# Patient Record
Sex: MALE | Race: WHITE | Employment: FULL TIME | URBAN - METROPOLITAN AREA
[De-identification: names, ages, dates, MRNs, and addresses within clinical notes are randomized per-mention and may not be internally consistent; named-entity substitution may affect disease eponyms.]

---

## 2020-07-22 ENCOUNTER — APPOINTMENT (OUTPATIENT)
Dept: GENERAL RADIOLOGY | Age: 30
End: 2020-07-22

## 2020-07-22 ENCOUNTER — HOSPITAL ENCOUNTER (EMERGENCY)
Age: 30
Discharge: HOME | End: 2020-07-22

## 2020-07-22 VITALS
OXYGEN SATURATION: 100 % | HEART RATE: 80 BPM | SYSTOLIC BLOOD PRESSURE: 107 MMHG | DIASTOLIC BLOOD PRESSURE: 70 MMHG | RESPIRATION RATE: 18 BRPM | TEMPERATURE: 98.4 F

## 2020-07-22 PROCEDURE — 12001 RPR S/N/AX/GEN/TRNK 2.5CM/<: CPT

## 2020-07-22 PROCEDURE — 73130 X-RAY EXAM OF HAND: CPT

## 2020-07-22 PROCEDURE — 6370000000 HC RX 637 (ALT 250 FOR IP): Performed by: PHYSICIAN ASSISTANT

## 2020-07-22 PROCEDURE — 2500000003 HC RX 250 WO HCPCS: Performed by: PHYSICIAN ASSISTANT

## 2020-07-22 PROCEDURE — 99283 EMERGENCY DEPT VISIT LOW MDM: CPT

## 2020-07-22 RX ORDER — LIDOCAINE HYDROCHLORIDE AND EPINEPHRINE 10; 10 MG/ML; UG/ML
20 INJECTION, SOLUTION INFILTRATION; PERINEURAL ONCE
Status: COMPLETED | OUTPATIENT
Start: 2020-07-22 | End: 2020-07-22

## 2020-07-22 RX ORDER — CEPHALEXIN 500 MG/1
500 CAPSULE ORAL 3 TIMES DAILY
Qty: 21 CAPSULE | Refills: 0 | Status: SHIPPED | OUTPATIENT
Start: 2020-07-22 | End: 2020-07-29

## 2020-07-22 RX ORDER — CEPHALEXIN 500 MG/1
1000 CAPSULE ORAL ONCE
Status: COMPLETED | OUTPATIENT
Start: 2020-07-22 | End: 2020-07-22

## 2020-07-22 RX ADMIN — LIDOCAINE HYDROCHLORIDE,EPINEPHRINE BITARTRATE 20 ML: 10; .01 INJECTION, SOLUTION INFILTRATION; PERINEURAL at 20:21

## 2020-07-22 RX ADMIN — CEPHALEXIN 1000 MG: 500 CAPSULE ORAL at 20:27

## 2020-07-22 ASSESSMENT — PAIN DESCRIPTION - ORIENTATION
ORIENTATION: RIGHT
ORIENTATION: RIGHT

## 2020-07-22 ASSESSMENT — PAIN DESCRIPTION - DESCRIPTORS
DESCRIPTORS: BURNING;CONSTANT
DESCRIPTORS: ACHING

## 2020-07-22 ASSESSMENT — PAIN DESCRIPTION - LOCATION
LOCATION: HAND
LOCATION: HAND

## 2020-07-22 ASSESSMENT — PAIN DESCRIPTION - PAIN TYPE
TYPE: ACUTE PAIN
TYPE: ACUTE PAIN

## 2020-07-22 ASSESSMENT — PAIN SCALES - GENERAL
PAINLEVEL_OUTOF10: 7
PAINLEVEL_OUTOF10: 3

## 2020-07-22 ASSESSMENT — PAIN - FUNCTIONAL ASSESSMENT: PAIN_FUNCTIONAL_ASSESSMENT: PREVENTS OR INTERFERES SOME ACTIVE ACTIVITIES AND ADLS

## 2020-07-22 ASSESSMENT — PAIN DESCRIPTION - PROGRESSION: CLINICAL_PROGRESSION: GRADUALLY WORSENING

## 2020-07-22 ASSESSMENT — PAIN SCALES - WONG BAKER: WONGBAKER_NUMERICALRESPONSE: 8

## 2020-07-22 ASSESSMENT — PAIN DESCRIPTION - FREQUENCY: FREQUENCY: CONTINUOUS

## 2020-07-22 NOTE — ED PROVIDER NOTES
[address] [facility]        Pt Name: Miklaa Zhou  MRN: [id number]  [place] 1990  Date of evaluation: 7/22/2020  Provider: Sagar Layne PA-C  PCP: No primary care provider on file. Evaluation by JT. My supervising physician was available for consultation. [facility], [address] [place] [address]       Chief Complaint   Patient presents with    Laceration     right hand       HISTORY OF PRESENT ILLNESS   (Location, Timing/Onset, Context/Setting, Quality, Duration, Modifying Factors, Severity, Associated Signs and Symptoms)  Note limiting factors. Mikala Zhou is a 35 y.o. male patient resenting with his father with complaint laceration dominant right hand dorsal sciatic and MCP joint. No distal paresthesia. He has good movement. States about 5:30 PM this evening doing dishes, washing a glass when the top broke causing this laceration injury. He does state all pieces accounted for. Tetanus shot less than 5 years. Nursing Notes were all reviewed and agreed with or any disagreements were addressed in the HPI. REVIEW OF SYSTEMS    (2-9 systems for level 4, 10 or more for level 5)     Review of Systems    Positives and Pertinent negatives as per HPI. Except as noted above in the ROS, all other systems were reviewed and negative. PAST MEDICAL HISTORY     Past Medical History:   Diagnosis Date    ADHD (attention deficit hyperactivity disorder)          SURGICAL HISTORY   No past surgical history on file. [address]       Discharge Medication List as of 7/22/2020  9:11 PM            ALLERGIES     Patient has no known allergies. FAMILYHISTORY     No family history on file.        SOCIAL HISTORY       Social History     Tobacco Use    Smoking status: Current Every Day Smoker     Packs/day: 1.00   Substance Use Topics    Alcohol use: Yes     Comment: SOCIALLY    Drug use: Yes     Types: Marijuana       SCREENINGS    Mauricio Coma Scale  Eye Opening: Spontaneous  Best Verbal Response: Oriented  Best Motor Response: Obeys commands  [place] Coma Scale Score: 15        PHYSICAL EXAM    (up to 7 for level 4, 8 or more for level 5)     ED Triage Vitals [07/22/20 1924]   BP Temp Temp Source Pulse Resp SpO2 Height Weight   107/70 98.4 °F (36.9 °C) Oral 80 18 100 % -- --       Physical Exam  Vitals signs and nursing note reviewed. Constitutional:       Appearance: Normal appearance. He is well-developed and normal weight. HENT:      Head: Normocephalic and atraumatic. Right Ear: External ear normal.      Left Ear: External ear normal.   Eyes:      General: No scleral icterus. Right eye: No discharge. Left eye: No discharge. Conjunctiva/sclera: Conjunctivae normal.   Neck:      Musculoskeletal: Normal range of motion and neck supple. Cardiovascular:      Rate and Rhythm: Normal rate and regular rhythm. Heart sounds: Normal heart sounds. Pulmonary:      Effort: Pulmonary effort is normal.      Breath sounds: Normal breath sounds. Musculoskeletal: Normal range of motion. Comments: Patient has a curvilinear laceration dorsal aspect of the dominant right second MCP joint. He has good strength against resistance. Low suspicion for extensor tendon laceration. Patient does have sensory integrity to the tip. Good brisk nailbed refill noted less than 2 seconds. Skin:     General: Skin is warm and dry. Neurological:      General: No focal deficit present. Mental Status: He is alert and oriented to person, place, and time. Mental status is at baseline. Psychiatric:         Mood and Affect: Mood normal.         Behavior: Behavior normal.         Thought Content:  Thought content normal.         Judgment: Judgment normal.               DIAGNOSTIC RESULTS   LABS:    Labs Reviewed - No data to display    All other labs were within normal range or not returned as of this dictation. EKG: All EKG's are interpreted by the Emergency Department Physician in the absence of a cardiologist.  Please see their note for interpretation of EKG. RADIOLOGY:   Non-plain film images such as CT, Ultrasound and MRI are read by the radiologist. Plain radiographic images are visualized and preliminarily interpreted by the ED Provider with the below findings:    X-ray shows no evidence foreign body. Interpretation per the Radiologist below, if available at the time of this note:    XR HAND RIGHT (MIN 3 VIEWS)   Final Result   No acute osseous abnormality. No retained radiopaque foreign body. No results found. PROCEDURES     I did use 1% labium with epinephrine to perform a local anesthesia. Once anesthesia was noted was draped in sterile fashion cleansed with chlorhexidine the rinse and irrigated with copious amounts of saline. Exploration reveals evidence of tendon exposure without transection of tendon. Excellent strength against resistance. Total knee length is 2 cm. The wound was primarily closed. I did not see any for material in the wound. X-ray obtained showed no evidence of foreign body. Patient given Keflex 1000 mg p.o. Epic and a dry sterile dressing secured over the site. Procedures    CRITICAL CARE TIME   N/A    CONSULTS:  None      EMERGENCY DEPARTMENT COURSE and DIFFERENTIAL DIAGNOSIS/MDM:   Vitals:    Vitals:    07/22/20 1924   BP: 107/70   Pulse: 80   Resp: 18   Temp: 98.4 °F (36.9 °C)   TempSrc: Oral   SpO2: 100%       Patient was given the following medications:  Medications   lidocaine-EPINEPHrine 1 percent-1:[id number] injection 20 mL (20 mLs Intradermal Given by Other 7/22/20 2021)   cephALEXin (KEFLEX) capsule 1,000 mg (1,000 mg Oral Given 7/22/20 2027)           Patient resenting with a 2 cm laceration curvilinear over the dorsal aspect on the right hand second MCP joint. Primary closure tonight. Tendon exposure without transection. Excellent strength against resistance. No foreign body seen on visual exam.  No foreign body seen on x-ray. I do recommend patient have suture removal in approximately 10 days by healthcare provider. He was ibuprofen 600 mg and/or Tylenol 1000 mg for pain control. Elevate and apply ice as needed. The patient father both expressed understanding of the diagnosis and the treatment plan. FINAL IMPRESSION      1. Laceration of right hand without foreign body, initial encounter          DISPOSITION/PLAN   DISPOSITION        PATIENT REFERREDTO:  Your healthcare provider    In 10 days  For suture removal    [address] [facility] Emergency Department  [address] [address] [phone]  Go in 10 days  For suture removal      DISCHARGE MEDICATIONS:  Discharge Medication List as of 7/22/2020  9:11 PM      START taking these medications    Details   cephALEXin (KEFLEX) 500 MG capsule Take 1 capsule by mouth 3 times daily for 7 days, Disp-21 capsule,R-0Print             DISCONTINUED MEDICATIONS:  Discharge Medication List as of 7/22/2020  9:11 PM                 (Please note that portions of this note were completed with a voice recognition program.  Efforts were made to edit the dictations but occasionally words are mis-transcribed. )    Jacob Zhang PA-C (electronically signed)           Jacob Zhang PA-C  07/22/20 [id number]

## 2021-08-10 ENCOUNTER — HOSPITAL ENCOUNTER (EMERGENCY)
Age: 31
Discharge: HOME | End: 2021-08-10
Payer: MEDICAID

## 2021-08-10 VITALS
HEART RATE: 88 BPM | SYSTOLIC BLOOD PRESSURE: 135 MMHG | RESPIRATION RATE: 16 BRPM | TEMPERATURE: 98.7 F | OXYGEN SATURATION: 100 % | DIASTOLIC BLOOD PRESSURE: 86 MMHG

## 2021-08-10 DIAGNOSIS — L02.419 CELLULITIS AND ABSCESS OF UPPER EXTREMITY: Primary | ICD-10-CM

## 2021-08-10 DIAGNOSIS — L03.119 CELLULITIS AND ABSCESS OF UPPER EXTREMITY: Primary | ICD-10-CM

## 2021-08-10 PROCEDURE — 2500000003 HC RX 250 WO HCPCS: Performed by: PHYSICIAN ASSISTANT

## 2021-08-10 PROCEDURE — 6370000000 HC RX 637 (ALT 250 FOR IP): Performed by: PHYSICIAN ASSISTANT

## 2021-08-10 PROCEDURE — 99284 EMERGENCY DEPT VISIT MOD MDM: CPT

## 2021-08-10 PROCEDURE — 10061 I&D ABSCESS COMP/MULTIPLE: CPT

## 2021-08-10 RX ORDER — GREEN TEA/HOODIA GORDONII 315-12.5MG
1 CAPSULE ORAL 2 TIMES DAILY
Qty: 60 TABLET | Refills: 0 | Status: SHIPPED | OUTPATIENT
Start: 2021-08-10 | End: 2021-09-09

## 2021-08-10 RX ORDER — IBUPROFEN 600 MG/1
600 TABLET ORAL EVERY 6 HOURS PRN
Qty: 30 TABLET | Refills: 0 | Status: SHIPPED | OUTPATIENT
Start: 2021-08-10

## 2021-08-10 RX ORDER — HYDROCODONE BITARTRATE AND ACETAMINOPHEN 5; 325 MG/1; MG/1
1 TABLET ORAL ONCE
Status: COMPLETED | OUTPATIENT
Start: 2021-08-10 | End: 2021-08-10

## 2021-08-10 RX ORDER — LIDOCAINE HYDROCHLORIDE AND EPINEPHRINE BITARTRATE 20; .01 MG/ML; MG/ML
20 INJECTION, SOLUTION SUBCUTANEOUS ONCE
Status: COMPLETED | OUTPATIENT
Start: 2021-08-10 | End: 2021-08-10

## 2021-08-10 RX ORDER — CLINDAMYCIN HYDROCHLORIDE 300 MG/1
300 CAPSULE ORAL 3 TIMES DAILY
Qty: 21 CAPSULE | Refills: 0 | Status: SHIPPED | OUTPATIENT
Start: 2021-08-10 | End: 2021-08-17

## 2021-08-10 RX ADMIN — HYDROCODONE BITARTRATE AND ACETAMINOPHEN 1 TABLET: 5; 325 TABLET ORAL at 21:49

## 2021-08-10 RX ADMIN — LIDOCAINE HYDROCHLORIDE AND EPINEPHRINE 20 ML: 20; 10 INJECTION, SOLUTION INFILTRATION; PERINEURAL at 22:50

## 2021-08-10 ASSESSMENT — PAIN DESCRIPTION - DESCRIPTORS: DESCRIPTORS: ACHING

## 2021-08-10 ASSESSMENT — PAIN SCALES - GENERAL
PAINLEVEL_OUTOF10: 7

## 2021-08-10 ASSESSMENT — PAIN DESCRIPTION - ORIENTATION: ORIENTATION: RIGHT;LOWER

## 2021-08-10 ASSESSMENT — PAIN DESCRIPTION - FREQUENCY: FREQUENCY: CONTINUOUS

## 2021-08-10 ASSESSMENT — PAIN DESCRIPTION - PROGRESSION: CLINICAL_PROGRESSION: NOT CHANGED

## 2021-08-10 ASSESSMENT — PAIN DESCRIPTION - ONSET: ONSET: ON-GOING

## 2021-08-10 ASSESSMENT — PAIN DESCRIPTION - LOCATION: LOCATION: ARM

## 2021-08-10 ASSESSMENT — PAIN - FUNCTIONAL ASSESSMENT
PAIN_FUNCTIONAL_ASSESSMENT: 0-10
PAIN_FUNCTIONAL_ASSESSMENT: PREVENTS OR INTERFERES SOME ACTIVE ACTIVITIES AND ADLS

## 2021-08-10 ASSESSMENT — PAIN DESCRIPTION - PAIN TYPE: TYPE: ACUTE PAIN

## 2021-08-11 NOTE — ED PROVIDER NOTES
[address]  [address] [address]  Dept: [phone]  Loc: [address] ENCOUNTER        This patient was not seen or evaluated by the attending physician. I evaluated this patient, the attending physician was available for consultation. CHIEF COMPLAINT    Chief Complaint   Patient presents with    Insect Bite     Pt reports getting \"bit by spider over a week ago\" and presents with wounds two wounds to right arm and 1 to left leg. Pt states he was seen at another hospital and discharged home with antibiotics which \"haven't helped\".  Wound Check       HPI    Amparo Hernandez is a 28 y.o. male who presents with an area of pain, redness, and swelling localized to his right arm. Onset was about a week ago. The duration has been constant since the onset. The quality is sharp. There are no alleviating factors. The patient had previously been seen at an outside urgency room, and was started on Keflex, Bactrim. He had an incision and drainage to his left leg, but the right arm did not require incision and drainage at that time. He states in the last few days that the arm has become more red and swollen in 2 areas, so he is back to be evaluated. His leg, however, has improved markedly. Just prior to arrival tonight, he was at [facility] emergency room, and had been waiting for some time, got frustrated and left before he had any interventions done. Patient denies any fever, chills, nausea or vomiting. He is not diabetic. He denies any previous history of drug use. He has no further complaints at this time. REVIEW OF SYSTEMS    Skin: Painful skin lesion as mentioned above  General: No fevers or chills    PAST MEDICAL & SURGICAL HISTORY    Past Medical History:   Diagnosis Date    ADHD (attention deficit hyperactivity disorder)      No past surgical history on file.     CURRENT MEDICATIONS  (may include discharge medications prescribed in the ED)  Current Outpatient Rx   Medication Sig Dispense Refill    clindamycin (CLEOCIN) 300 MG capsule Take 1 capsule by mouth 3 times daily for 7 days 21 capsule 0    Probiotic Acidophilus (FLORANEX) TABS Take 1 tablet by mouth 2 times daily 60 tablet 0    ibuprofen (ADVIL;MOTRIN) 600 MG tablet Take 1 tablet by mouth every 6 hours as needed for Pain 30 tablet 0       ALLERGIES    No Known Allergies    SOCIAL & FAMILY HISTORY    Social History     Socioeconomic History    Marital status: Single     Spouse name: Not on file    Number of children: Not on file    Years of education: Not on file    Highest education level: Not on file   Occupational History    Not on file   Tobacco Use    Smoking status: Current Every Day Smoker     Packs/day: 1.00   Substance and Sexual Activity    Alcohol use: Yes     Comment: SOCIALLY    Drug use: Yes     Types: Marijuana    Sexual activity: Not on file   Other Topics Concern    Not on file   Social History Narrative    Not on file     Social Determinants of Health     Financial Resource Strain:     Difficulty of Paying Living Expenses:    Food Insecurity:     Worried About Running Out of Food in the Last Year:     Ran Out of Food in the Last Year:    Transportation Needs:     Lack of Transportation (Medical):      Lack of Transportation (Non-Medical):    Physical Activity:     Days of Exercise per Week:     Minutes of Exercise per Session:    Stress:     Feeling of Stress :    Social Connections:     Frequency of Communication with Friends and Family:     Frequency of Social Gatherings with Friends and Family:     Attends Oriental orthodox Services:     Active Member of Clubs or Organizations:     Attends Club or Organization Meetings:     Marital Status:    Intimate Partner Violence:     Fear of Current or Ex-Partner:     Emotionally Abused:     Physically Abused:     Sexually Abused:      No family history on file.    PHYSICAL EXAM    VITAL SIGNS: /86   Pulse 88   Temp 98.7 °F (37.1 °C) (Oral)   Resp 16   SpO2 100%   Constitutional:  Well developed, well nourished, no acute distress  HENT:  Atraumatic, no facial or lip swelling  Oral:  No tongue swelling, airway patent  Neck: no swelling  Respiratory:  No respiratory distress, breathing comfortably  Cardiovascular:  no JVD   Musculoskeletal:  No edema, no acute deformities  Vascular: left radial pulse 2+  Integument:  + 2x2 cm area of tenderness, induration, and fluctuance located to right inner arm, 5x4 cm area of surrounding erythema, with opening in middle draining serous fluid. 2x2 cm area of tenderness, induration, and fluctuance located to right inner arm, 2x2 cm area of surrounding erythema. RADIOLOGY  No orders to display       PROCEDURE  Incision and Drainage Procedure Note  Consent:  Verbal  Consent given by:  Patient  Risks discussed:  Pain and incomplete drainage  Alternatives discussed:  Delayed treatment  Location:   Type:  Abscess  Location:  Right inner forearm  Anesthesia (see MAR for exact dosages): Anesthesia method:  Local infiltration  Local anesthetic:  Lidocaine 2% w/o epi  Procedure complexity:  Simple  Procedure details:   Incision types:  None - wound open already  Wound management:  Probed and deloculated  Drainage:  Serosanguinous  Drainage amount:  Scant  Wound treatment:  Wound cleaned, irrigated copiously, explored with CTA with no evidence of tracking noted, scant serosanguinous discharge noted. Packing materials:  1/4\" gauze  Patient tolerance of procedure: Tolerated well, no immediate complications    Incision and Drainage Procedure Note  Consent:  Verbal  Consent given by:  Patient  Risks discussed:  Pain and incomplete drainage  Alternatives discussed:  Delayed treatment  Location:   Type:  Abscess  Location:  Right outer forearm  Anesthesia (see MAR for exact dosages):    Anesthesia method:  Local infiltration  Local anesthetic:  Lidocaine 2% w/o epi  Procedure complexity:  Simple  Procedure details:   Incision types:  None - scab removed and cavity noted underneath  Wound management:  Probed and deloculated  Drainage:  Serosanguinous  Drainage amount:  Scant  Wound treatment:  Wound cleaned, irrigated copiously, explored with CTA with no evidence of tracking noted, scant serosanguinous discharge noted. Packing materials:  1/4\" gauze  Patient tolerance of procedure: Tolerated well, no immediate complications      ED COURSE & MEDICAL DECISION MAKING    See chart for details of medications prescribed. Vitals:    08/10/21 2024 08/10/21 2321   BP: (!) 140/66 135/86   Pulse: 90 88   Resp: 18 16   Temp: 98.7 °F (37.1 °C)    TempSrc: Oral    SpO2: 99% 100%       Differential diagnosis: necrotizing fasciitis, deep space soft tissue bacterial skin infection, viral rash, systemic infectious rash, aseptic cyst, malignancy, lymphadenopathy, other    Patient is afebrile and nontoxic in appearance. Pt given prescription for clindamycin, probiotics, motrin. He will follow up with PCP in 2 days for wound recheck, but if that is not able to be facilitated he is to return to the ED for re-evaluation and wound check. I instructed the patient to follow up in 2 days for wound recheck. I instructed the patient to return to the ED immediately for any new or worsening symptoms. The patient verbalized understanding. FINAL IMPRESSION    1.  Cellulitis and abscess of upper extremity        PLAN  Discharge with close outpatient follow-up (see EMR)       (Please note that this note was completed with a voice recognition program.  Every attempt was made to edit the dictations, but inevitably there remain words that are mis-transcribed.)          Patricia Fernandez  08/10/21 [id number]

## 2021-08-11 NOTE — ED NOTES
Bed: C-20  Expected date: 8/10/21  Expected time: 8:27 PM  Means of arrival: Walk In  Comments:  Insect bite     Nicolas Austin RN  08/10/21 2033

## 2021-08-11 NOTE — ED NOTES
Discharge instructions and prescriptions discussed/given to pt, all questions answered. No signs of acute distress noted. Pt left ambulatory, steady gait. Gauze dressing applied to wounds.       Heather Cardenas RN  08/10/21 4259

## 2022-09-27 ENCOUNTER — HOSPITAL ENCOUNTER (EMERGENCY)
Age: 32
Discharge: HOME | End: 2022-09-27
Payer: MEDICAID

## 2022-09-27 VITALS
TEMPERATURE: 98.2 F | HEIGHT: 69 IN | RESPIRATION RATE: 18 BRPM | OXYGEN SATURATION: 98 % | WEIGHT: 140.65 LBS | BODY MASS INDEX: 20.83 KG/M2 | HEART RATE: 68 BPM | SYSTOLIC BLOOD PRESSURE: 119 MMHG | DIASTOLIC BLOOD PRESSURE: 76 MMHG

## 2022-09-27 DIAGNOSIS — S39.012A STRAIN OF LUMBAR REGION, INITIAL ENCOUNTER: Primary | ICD-10-CM

## 2022-09-27 PROCEDURE — 99284 EMERGENCY DEPT VISIT MOD MDM: CPT

## 2022-09-27 PROCEDURE — 6360000002 HC RX W HCPCS: Performed by: PHYSICIAN ASSISTANT

## 2022-09-27 PROCEDURE — 96372 THER/PROPH/DIAG INJ SC/IM: CPT

## 2022-09-27 RX ORDER — CYCLOBENZAPRINE HCL 10 MG
10 TABLET ORAL 3 TIMES DAILY PRN
Qty: 21 TABLET | Refills: 0 | Status: SHIPPED | OUTPATIENT
Start: 2022-09-27 | End: 2022-10-07

## 2022-09-27 RX ORDER — KETOROLAC TROMETHAMINE 30 MG/ML
30 INJECTION, SOLUTION INTRAMUSCULAR; INTRAVENOUS ONCE
Status: COMPLETED | OUTPATIENT
Start: 2022-09-27 | End: 2022-09-27

## 2022-09-27 RX ORDER — KETOROLAC TROMETHAMINE 10 MG/1
10 TABLET, FILM COATED ORAL 3 TIMES DAILY
Qty: 15 TABLET | Refills: 0 | Status: SHIPPED | OUTPATIENT
Start: 2022-09-27 | End: 2022-10-02

## 2022-09-27 RX ADMIN — KETOROLAC TROMETHAMINE 30 MG: 30 INJECTION, SOLUTION INTRAMUSCULAR at 17:04

## 2022-09-27 ASSESSMENT — ENCOUNTER SYMPTOMS
BACK PAIN: 1
NAUSEA: 0

## 2022-09-27 ASSESSMENT — PAIN DESCRIPTION - LOCATION: LOCATION: BACK

## 2022-09-27 ASSESSMENT — PAIN DESCRIPTION - ORIENTATION: ORIENTATION: LOWER

## 2022-09-27 ASSESSMENT — PAIN SCALES - GENERAL
PAINLEVEL_OUTOF10: 8
PAINLEVEL_OUTOF10: 8
PAINLEVEL_OUTOF10: 7

## 2022-09-27 ASSESSMENT — PAIN - FUNCTIONAL ASSESSMENT
PAIN_FUNCTIONAL_ASSESSMENT: 0-10
PAIN_FUNCTIONAL_ASSESSMENT: 0-10

## 2022-09-28 NOTE — ED PROVIDER NOTES
[address] [facility]      Pt Name: Nina Sears  MRN: [id number]  [place] 1990  Date of evaluation: 9/27/2022  Provider: Lauryn Rodriguez PA-C    This patient was not seen and evaluated by the attending physician No att. providers found. CHIEF COMPLAINT      Chief Complaint: back pain      HISTORYOF PRESENT ILLNESS  (Location/Symptom, Timing/Onset, Context/Setting, Quality, Duration, Modifying Factors, Severity.)   Nina Sears is a 33 y.o. male who presents to the emergency department complaining of left low back pain that started after a coughing fit this morning. He says he thinks he pulled a muscle. The pain is rated 7 out of 10. It is constant and worse with any movement. Nothing makes it better. Reports no associated numbness or weakness of extremities, loss control of bowel or bladder, saddle anesthesia, fever, history of IV drug use or other symptoms      Nursing Notes were reviewed and I agree. REVIEW OF SYSTEMS    (2-9 systems for level 4, 10 or more forlevel 5)     Review of Systems   Constitutional:  Negative for chills and fever. Gastrointestinal:  Negative for nausea. Genitourinary:  Negative for difficulty urinating. Musculoskeletal:  Positive for back pain. Negative for arthralgias and neck pain. Skin:  Negative for rash and wound. Neurological:  Negative for weakness and numbness. All other systems reviewed and are negative. Positives and Pertinent negatives as per HPI. Except as noted above the remainder of the review of systems was reviewed and negative. PAST MEDICALHISTORY         Diagnosis Date    ADHD (attention deficit hyperactivity disorder)        SURGICAL HISTORY     No past surgical history on file.     CURRENT MEDICATIONS       Discharge Medication List as of 9/27/2022  5:10 PM        CONTINUE these medications which have NOT CHANGED    Details   ibuprofen (ADVIL;MOTRIN) 600 MG tablet Take 1 tablet by mouth every 6 hours as needed for Pain, Disp-30 tablet, R-0Print             ALLERGIES     Patient has no known allergies. FAMILY HISTORY     No family history on file. No family status information on file. SOCIAL HISTORY    reports that he has been smoking. He has been smoking an average of 1 pack per day. He does not have any smokeless tobacco history on file. He reports current alcohol use. He reports current drug use. Drug: Marijuana Berneta Filippo). PHYSICAL EXAM    (up to 7 for level 4, 8 or more for level 5)     ED Triage Vitals [09/27/22 1619]   BP Temp Temp Source Heart Rate Resp SpO2 Height Weight   119/76 98.2 °F (36.8 °C) Oral 68 18 98 % 5' 9\" (1.753 m) 140 lb 10.5 oz (63.8 kg)       Physical Exam  Vitals and nursing note reviewed. Constitutional:       General: He is not in acute distress. Appearance: He is well-developed. HENT:      Head: Normocephalic and atraumatic. Pulmonary:      Effort: Pulmonary effort is normal. No respiratory distress. Musculoskeletal:         General: Tenderness (left low back without midline tenderness no skin changes) present. Normal range of motion. Cervical back: Neck supple. Skin:     General: Skin is warm and dry. Neurological:      General: No focal deficit present. Mental Status: He is alert and oriented to person, place, and time. Sensory: No sensory deficit. Motor: No weakness. Psychiatric:         Behavior: Behavior normal.          EMERGENCY DEPARTMENT COURSE and DIFFERENTIAL DIAGNOSIS/MDM:   Vitals:    Vitals:    09/27/22 1619   BP: 119/76   Pulse: 68   Resp: 18   Temp: 98.2 °F (36.8 °C)   TempSrc: Oral   SpO2: 98%   Weight: 140 lb 10.5 oz (63.8 kg)   Height: 5' 9\" (1.753 m)        I have evaluated this patient. My supervising physician was available for consultation. Patient is nontoxic and afebrile. No acute neurologic signs or symptoms concerning for acute neurosurgical emergency.   He was treated with IM Toradol and discharged with scheduled Toradol and as needed Flexeril. He was educated on low back exercises he can perform and was given referral to follow-up with PCP if not better in 3 to 5 days      Discussed results, diagnosis and plan with patient and/or family. Questions addressed. Dispositionand follow-up agreed upon. Specific discharge instructions explained. The patient and/or family and I have discussed the diagnosis and risks, and we agree with discharging home to follow-up with their primary care,specialist or referral doctor. We also discussed returning to the Emergency Department immediately if new or worsening symptoms occur. We have discussed the symptoms which are most concerning that necessitate immediatereturn. PROCEDURES:  None    FINAL IMPRESSION      1.  Strain of lumbar region, initial encounter          DISPOSITION/PLAN   DISPOSITION Decision To Discharge 09/27/2022 04:30:11 PM      PATIENT REFERRED TO:  [facility]) Pre-Services  [phone]  Schedule an appointment as soon as possible for a visit       [facility] Emergency Department  [address] [place] [address]  [phone]    If symptoms worsen      MEDICATIONS:  Discharge Medication List as of 9/27/2022  5:10 PM        START taking these medications    Details   ketorolac (TORADOL) 10 MG tablet Take 1 tablet by mouth three times daily for 5 days, Disp-15 tablet, R-0Normal      cyclobenzaprine (FLEXERIL) 10 MG tablet Take 1 tablet by mouth 3 times daily as needed for Muscle spasms, Disp-21 tablet, R-0Normal             (Please note that portions of this note were completed with a voice recognition program.  Efforts were made toedit the dictations but occasionally words are mis-transcribed.)    JULISSA Guerrero PA-C  09/27/22 4762